# Patient Record
(demographics unavailable — no encounter records)

---

## 2025-05-09 NOTE — DISCUSSION/SUMMARY
[de-identified] : Right foot pain  HPI Patient is a 72-year-old female accompanied by her daughter who reports to the office for evaluation of her right foot pain since yesterday, 5/8/2025.  She states that she accidentally stepped off of the bus awkwardly and twisted her right foot.  Since then, admits to redness, bruising, and swelling on the dorsal aspect of her right foot.  Walking, range of motion, and palpating certain areas aggravate the patient's pain.  Denies any numbness or tingling.  Weightbearing x-rays taken of the patient's right foot in office today reveal a nondisplaced fracture noted of the 2nd and 3rd metatarsal heads.  No Lisfranc widening.  No dislocation.  Otherwise, no other significant abnormalities were seen.  Right foot exam is as follows: Mild dorsal foot swelling, erythema, and slight ecchymosis noted.  There is increased warmth to touch on dorsal aspect of right foot.  Tenderness to palpation over 2nd and 3rd metatarsal heads.  No Lisfranc tenderness.  Decent range of motion with mild stiffness.  There is decent strength.  Intact pulse.  Light touch intact throughout.  Antalgic gait.  Ambulation with a cane.  Assessment/plan Explained fracture on x-ray.  Patient clinically also has cellulitis.  Bactrim -160 mg Rx sent to pharmacy.  She may take Tylenol or Motrin as needed for pain.  The patient was advised to rest/ice the area and may alternate with warm compresses as needed.   She was provided with a short cam walker boot and may weight-bear as tolerated.  The boot may come on and off for hygiene purposes.  Follow-up in 1 to 2 weeks for repeat weightbearing right foot x-rays and further evaluation with Dr. Narayanan.  All question/concerns were answered in detail.